# Patient Record
Sex: FEMALE | NOT HISPANIC OR LATINO | Employment: OTHER | RURAL
[De-identification: names, ages, dates, MRNs, and addresses within clinical notes are randomized per-mention and may not be internally consistent; named-entity substitution may affect disease eponyms.]

---

## 2019-11-14 ENCOUNTER — OFFICE VISIT (OUTPATIENT)
Dept: CARDIOLOGY | Facility: CLINIC | Age: 74
End: 2019-11-14

## 2019-11-14 VITALS
SYSTOLIC BLOOD PRESSURE: 120 MMHG | HEIGHT: 59 IN | RESPIRATION RATE: 16 BRPM | OXYGEN SATURATION: 96 % | DIASTOLIC BLOOD PRESSURE: 78 MMHG | WEIGHT: 192 LBS | BODY MASS INDEX: 38.71 KG/M2 | HEART RATE: 86 BPM

## 2019-11-14 DIAGNOSIS — R42 DIZZINESS: Primary | ICD-10-CM

## 2019-11-14 PROCEDURE — 99204 OFFICE O/P NEW MOD 45 MIN: CPT | Performed by: INTERNAL MEDICINE

## 2019-11-14 PROCEDURE — 93000 ELECTROCARDIOGRAM COMPLETE: CPT | Performed by: INTERNAL MEDICINE

## 2019-11-14 RX ORDER — PANTOPRAZOLE SODIUM 40 MG/1
40 TABLET, DELAYED RELEASE ORAL DAILY
COMMUNITY

## 2019-11-14 RX ORDER — ROSUVASTATIN CALCIUM 5 MG/1
5 TABLET, COATED ORAL DAILY
COMMUNITY

## 2019-11-14 RX ORDER — HYDROCODONE BITARTRATE AND ACETAMINOPHEN 5; 325 MG/1; MG/1
1 TABLET ORAL EVERY 4 HOURS PRN
COMMUNITY

## 2019-11-14 RX ORDER — BUPROPION HYDROCHLORIDE 150 MG/1
150 TABLET, EXTENDED RELEASE ORAL 2 TIMES DAILY
COMMUNITY
Start: 2019-09-25

## 2019-11-14 RX ORDER — GABAPENTIN 600 MG/1
600 TABLET ORAL
COMMUNITY

## 2019-11-14 RX ORDER — CITALOPRAM 20 MG/1
20 TABLET ORAL DAILY
COMMUNITY

## 2019-11-14 RX ORDER — GABAPENTIN 600 MG/1
600 TABLET ORAL 2 TIMES DAILY
Refills: 3 | COMMUNITY
Start: 2019-10-18

## 2019-11-14 RX ORDER — OMEPRAZOLE 20 MG/1
40 CAPSULE, DELAYED RELEASE ORAL DAILY
Refills: 0 | COMMUNITY
Start: 2019-09-11 | End: 2019-11-14

## 2019-11-14 RX ORDER — DOCUSATE SODIUM 100 MG/1
100 CAPSULE, LIQUID FILLED ORAL 2 TIMES DAILY PRN
Refills: 0 | COMMUNITY
Start: 2019-09-17

## 2019-11-14 RX ORDER — CYCLOBENZAPRINE HCL 10 MG
10 TABLET ORAL 3 TIMES DAILY PRN
Refills: 0 | COMMUNITY
Start: 2019-08-16

## 2019-11-14 RX ORDER — LEVOTHYROXINE SODIUM 0.07 MG/1
75 TABLET ORAL DAILY
Refills: 3 | COMMUNITY
Start: 2019-10-18

## 2019-11-14 NOTE — PATIENT INSTRUCTIONS
Recommend that the patient received 1 L IV fluid bolus now will at [facility] nursing [facility].  Please repeat orthostatic measurements after that and if still noting a drop in systolic blood pressure of greater than 20 mmHg give a second liter IV fluid bolus.  If repeat orthostatics are still positive we will place the patient on midodrine 2.5 mg 3 times daily.  Return to clinic in 3 months.

## 2019-11-14 NOTE — PROGRESS NOTES
[facility]      Patient Name: Mara Butler  :1945  Age: 74 y.o.  Encounter Provider:  Andre Gudino Jr, MD      Chief Complaint:   Chief Complaint   Patient presents with   • Dizziness         HPI  Mara Butler is a 74 y.o. female past medical history of osteoarthritis presents for evaluation of orthostatic hypotension.  Patient originally had right total knee replacement in mid September with uneventful recovery.  While walking around her home approximately one month after the first surgery she fell in her backyard and hit her head causing an episode of syncope.  She was taken back to the hospital due to severe damage to the right leg which required repeat intervention.  She has been at skilled nursing facility for the last few weeks and has recently noted some dizziness with postural changes.  Today when they measured orthostatics it was positive and the patient was sent to clinic for evaluation.  Looking at her BMP her BUN to creatinine ratio is slightly elevated as is her CO2 level.  She denies chest pain shortness of air or palpitations.  No orthopnea PND or edema.  She denies any other syncope other than the loss of consciousness after hitting her head.  No previous cardiac history or work-up.  Denies tobacco alcohol or illicit drug use.  Legs      The following portions of the patient's history were reviewed and updated as appropriate: allergies, current medications, past family history, past medical history, past social history, past surgical history and problem list.      Review of Systems   Eyes: Positive for blurred vision.   Respiratory: Positive for shortness of breath.    Skin: Positive for itching.   Musculoskeletal: Positive for joint pain and joint swelling.   Neurological: Positive for dizziness and light-headedness.   Psychiatric/Behavioral: Positive for depression.   All other systems reviewed and are negative.      OBJECTIVE:   Vital Signs  Vitals:    19  "1307   BP: 120/78   Pulse: 86   Resp: 16   SpO2: 96%     Estimated body mass index is 38.78 kg/m² as calculated from the following:    Height as of this encounter: 149.9 cm (59\").    Weight as of this encounter: 87.1 kg (192 lb).    Physical Exam   Constitutional: She is oriented to person, place, and time. She appears well-developed and well-nourished.   HENT:   Head: Normocephalic and atraumatic.   Eyes: Conjunctivae are normal. Pupils are equal, round, and reactive to light.   Neck: No JVD present. No thyromegaly present.   Cardiovascular: Exam reveals no gallop and no friction rub.   No murmur heard.  Pulmonary/Chest: No respiratory distress. She exhibits no tenderness.   Abdominal: Bowel sounds are normal. She exhibits no distension.   Musculoskeletal: She exhibits edema. She exhibits no tenderness.   1+ edema bilateral lower extremity to lower shin   Neurological: She is alert and oriented to person, place, and time.   Skin: No rash noted. No erythema.   Psychiatric: She has a normal mood and affect. Judgment normal.   Vitals reviewed.        ECG 12 Lead  Date/Time: 11/14/2019 1:38 PM  Performed by: Andre Gudino Jr., MD  Authorized by: Andre Gudino Jr., MD   Comparison: not compared with previous ECG   Previous ECG: no previous ECG available  Rhythm: sinus rhythm  Other findings: non-specific ST-T wave changes    Clinical impression: non-specific ECG            Cardiac Procedures:  1.         ASSESSMENT:      Diagnosis Plan   1. Dizziness           PLAN OF CARE:     1. Postural dizziness -orthostatic hypotension possibly secondary to dehydration.  Recommendation is to start with a 1 L bolus of IV fluids and repeat orthostatic measurements.  If still positive would give second bolus.  If this does not correct orthostatic hypotension recommend starting Midrin 2.5 mg 3 times daily.  2. Elevated blood pressure without diagnosis of hypertension -twice daily blood pressure log.    Return to clinic 3 " months           Discharge Medications           Accurate as of 11/14/19  1:23 PM. If you have any questions, ask your nurse or doctor.               Continue These Medications      Instructions Start Date   buPROPion  MG 12 hr tablet  Commonly known as:  WELLBUTRIN SR   150 mg, Oral, 2 Times Daily      citalopram 20 MG tablet  Commonly known as:  CeleXA   20 mg, Oral, Daily      cyclobenzaprine 10 MG tablet  Commonly known as:  FLEXERIL   10 mg, Oral, 3 Times Daily PRN      docusate sodium 100 MG capsule  Commonly known as:  COLACE   100 mg, Oral, 2 Times Daily PRN      gabapentin 600 MG tablet  Commonly known as:  NEURONTIN   600 mg, Oral, Every Night at Bedtime      gabapentin 600 MG tablet  Commonly known as:  NEURONTIN   600 mg, Oral, 2 Times Daily      HYDROcodone-acetaminophen 5-325 MG per tablet  Commonly known as:  NORCO   1 tablet, Oral, Every 4 Hours PRN      levothyroxine 75 MCG tablet  Commonly known as:  SYNTHROID, LEVOTHROID   75 mcg, Oral, Daily      pantoprazole 40 MG EC tablet  Commonly known as:  PROTONIX   40 mg, Oral, Daily      rosuvastatin 5 MG tablet  Commonly known as:  CRESTOR   5 mg, Oral, Daily         Stop These Medications    omeprazole 20 MG capsule  Commonly known as:  priLOSEC  Stopped by:  Andre Gudino Jr, MD            Thank you for allowing me to participate in the care of your patient,      Sincerely,   Andre Gudino MD   [facility]  11/14/19  1:23 PM